# Patient Record
Sex: FEMALE | Race: WHITE
[De-identification: names, ages, dates, MRNs, and addresses within clinical notes are randomized per-mention and may not be internally consistent; named-entity substitution may affect disease eponyms.]

---

## 2021-05-27 ENCOUNTER — HOSPITAL ENCOUNTER (OUTPATIENT)
Dept: HOSPITAL 19 - MC.RAD | Age: 71
End: 2021-05-27
Attending: FAMILY MEDICINE
Payer: MEDICARE

## 2021-05-27 DIAGNOSIS — Z12.31: Primary | ICD-10-CM

## 2021-08-19 ENCOUNTER — HOSPITAL ENCOUNTER (OUTPATIENT)
Dept: HOSPITAL 19 - SDCO | Age: 71
Discharge: HOME | End: 2021-08-19
Attending: FAMILY MEDICINE
Payer: MEDICARE

## 2021-08-19 VITALS — BODY MASS INDEX: 32.59 KG/M2 | HEIGHT: 64 IN | WEIGHT: 190.92 LBS

## 2021-08-19 VITALS — HEART RATE: 52 BPM | SYSTOLIC BLOOD PRESSURE: 140 MMHG | DIASTOLIC BLOOD PRESSURE: 79 MMHG

## 2021-08-19 VITALS — SYSTOLIC BLOOD PRESSURE: 149 MMHG | HEART RATE: 50 BPM | DIASTOLIC BLOOD PRESSURE: 80 MMHG

## 2021-08-19 VITALS — TEMPERATURE: 97 F | HEART RATE: 64 BPM | DIASTOLIC BLOOD PRESSURE: 89 MMHG | SYSTOLIC BLOOD PRESSURE: 139 MMHG

## 2021-08-19 VITALS — DIASTOLIC BLOOD PRESSURE: 77 MMHG | HEART RATE: 51 BPM | TEMPERATURE: 97.1 F | SYSTOLIC BLOOD PRESSURE: 125 MMHG

## 2021-08-19 DIAGNOSIS — K63.5: ICD-10-CM

## 2021-08-19 DIAGNOSIS — Z80.0: ICD-10-CM

## 2021-08-19 DIAGNOSIS — E78.5: ICD-10-CM

## 2021-08-19 DIAGNOSIS — I10: ICD-10-CM

## 2021-08-19 DIAGNOSIS — M19.90: ICD-10-CM

## 2021-08-19 DIAGNOSIS — E55.9: ICD-10-CM

## 2021-08-19 DIAGNOSIS — Z12.11: Primary | ICD-10-CM

## 2021-08-19 DIAGNOSIS — E66.9: ICD-10-CM

## 2021-08-19 DIAGNOSIS — M85.80: ICD-10-CM

## 2021-08-19 DIAGNOSIS — G47.33: ICD-10-CM

## 2021-08-19 DIAGNOSIS — Z79.899: ICD-10-CM

## 2021-08-19 NOTE — NUR
1145  Pt returns from endo procedure via cart and RN assist to [facility].
Pt ambulates from cart to recliner with RN assist.  Monitors on and alarms
set.  Call light within reach.  Report received from LESLEE Mcnally.  Pt alert and
oriented.  Pt requests muffin and juice.  Pt denies any pain or nausea.
 
1205  Pt taking food and drink well.  No complications noted.
 
1210  Discharge instructions given to pt.  All questions answered to her
satisfaction.  Handed to pt are a thank you card and discharge information.
 
1235  Pt transferred out of the hospital via wheelchair and this RN assist, to
private vehicle driven by .

## 2022-06-28 ENCOUNTER — HOSPITAL ENCOUNTER (OUTPATIENT)
Dept: HOSPITAL 19 - MC.RAD | Age: 72
End: 2022-06-28
Attending: FAMILY MEDICINE
Payer: MEDICARE

## 2022-06-28 DIAGNOSIS — Z80.3: ICD-10-CM

## 2022-06-28 DIAGNOSIS — Z12.31: Primary | ICD-10-CM
